# Patient Record
Sex: MALE | URBAN - METROPOLITAN AREA
[De-identification: names, ages, dates, MRNs, and addresses within clinical notes are randomized per-mention and may not be internally consistent; named-entity substitution may affect disease eponyms.]

---

## 2018-08-07 ENCOUNTER — PRE VISIT (OUTPATIENT)
Dept: ORTHOPEDICS | Facility: CLINIC | Age: 58
End: 2018-08-07

## 2018-08-07 NOTE — TELEPHONE ENCOUNTER
FUTURE VISIT INFORMATION      FUTURE VISIT INFORMATION:    Date: 10/4/18    Time: 9:30    [place] [facility] [place]FORMATION:    Referring provider:  Kwasi Johansen    Referring providers clinic:  [facility]    Reason for visit/diagnosis  Brachial Plexus Lesion of R upper extremity/shoulder per referral    RECORDS REQUESTED FROM:       Clinic name Comments Records Status Imaging Status   [facility]  received Mailed 8/10                                   RECORDS STATUS      RECORDS RECEIVED FROM: [facility]   DATE RECEIVED: 8/7   NOTES STATUS DETAILS   OFFICE NOTE from referring provider Received 6/14/18, 5/17/18, 5/1/18   MEDICATION LIST Received    IMPLANT RECORD/STICKER N/A    LABS     CBC/DIFF Received    CULTURES Received    INJECTIONS DONE IN RADIOLOGY Received [facility] 7/9/18   MRI Received 6/25/18, 5/11/18   CT SCAN Received 5/26/18   XRAYS (IMAGES & REPORTS) Received 4/24/18

## 2018-10-03 ASSESSMENT — ENCOUNTER SYMPTOMS
ARTHRALGIAS: 1
JOINT SWELLING: 0
NUMBNESS: 1
STIFFNESS: 1
INSOMNIA: 0
WEIGHT GAIN: 0
DISTURBANCES IN COORDINATION: 0
POLYPHAGIA: 0
DIZZINESS: 0
DECREASED CONCENTRATION: 1
NECK PAIN: 0
LOSS OF CONSCIOUSNESS: 0
MYALGIAS: 1
PARALYSIS: 0
NERVOUS/ANXIOUS: 0
WEIGHT LOSS: 0
DECREASED APPETITE: 0
CHILLS: 0
FATIGUE: 1
HEADACHES: 0
TREMORS: 0
MUSCLE CRAMPS: 1
BACK PAIN: 0
PANIC: 0
INCREASED ENERGY: 1
NIGHT SWEATS: 0
HALLUCINATIONS: 0
MUSCLE WEAKNESS: 1
ALTERED TEMPERATURE REGULATION: 1
POLYDIPSIA: 0
SPEECH CHANGE: 0
SEIZURES: 0
TINGLING: 1
FEVER: 0
DEPRESSION: 0
MEMORY LOSS: 1
WEAKNESS: 1

## 2018-10-04 ENCOUNTER — OFFICE VISIT (OUTPATIENT)
Dept: ORTHOPEDICS | Facility: CLINIC | Age: 58
End: 2018-10-04
Payer: OTHER MISCELLANEOUS

## 2018-10-04 VITALS — WEIGHT: 223.1 LBS | HEIGHT: 72 IN | BODY MASS INDEX: 30.22 KG/M2

## 2018-10-04 DIAGNOSIS — G54.0 BRACHIAL PLEXUS NEUROPATHY: Primary | ICD-10-CM

## 2018-10-04 NOTE — LETTER
10/4/2018       RE: Dev Solomon  [address]  [facility] [address]     Dear Colleague,    Thank you for referring your patient, Dev Solomon, to the [facility] at [facility]. Please see a copy of my visit note below.    [facility]  Orthopedics  Alda Nelson MD  10/04/2018     Name: Dev Solomon  MRN: [id number]  Age: 58 year old  : 1960  Referring provider: Kwasi Johansen     Chief Complaint: No chief complaint on file.     Date of Injury: 18    History of Present Illness:   Dev Solomon is a 58 year old, right handed male workers compensation patient who presents today for evaluation of a  right upper extremity weakness and pain referred by Dr. Johansen at [facility].  Jason was pulling and pushing around a large garbage create when he noted a pop in his right shoulder on 2018.  He was able to move his arm afterwards but noticed immediate onset of pain in the shoulder and significant weakness in the right upper extremity.  This progressed to the point where he was barely able to move his arm within a few days.  He saw an orthopedic surgeon close to home who recommended an MRI scan to evaluate for rotator cuff tear and received a steroid injection which was not helpful.   He eventually saw a neurologist and was diagnosed with parsonage fiore syndrome and has been working with physicial therapy ever since. Today he is here to discuss alternate treatment options and to confirm his diagnosis.    Review of Systems:   A 10-point review of systems was obtained and is negative except for as noted in the HPI.     Medications:   No current outpatient prescriptions on file.    Allergies:  Allergies not on file    Past Medical History:  No past medical history on file.    Past Surgical History:  No past surgical history on file.     Social History:  Patient lives with his wife in [place].  Works at [employer]. No  tobacco.    Social History     Social History     Marital status:      Spouse name: N/A     Number of children: N/A     Years of education: N/A     Social History Main Topics     Smoking status: Not on file     Smokeless tobacco: Not on file     Alcohol use Not on file     Drug use: Not on file     Sexual activity: Not on file     Other Topics Concern     Not on file     Social History Narrative     Family History:  No family history on file.    Physical Examination:  There were no vitals taken for this visit.   strength on left 105 pounds,  strength on right 60 pounds  General: Healthy appearing male. Affect appropriate. Normal gait. Alert and oriented to surroundings.   Right Upper Extremity: Sensation grossly intact to light touch to median ulnar and radial nerve distribution decreased to axillary nerve distribution on right compared to left.  Decreased sensation in medial antebrachial cutaneous nerve distribution on right compared to left.  Plus out of 5 strength to deltoid, 5/5 strength to elbow flexion, elbow extension, wrist flexion, wrist extension, FPL, EPL, interossei.  2+ radial pulse.  Winged scapula on the right.  Minimal atrophy in the supraspinatus and infraspinatus fossa.       Electrodiagnostic Studies:   An electrodiagnostic study of the right extremities was available for review today.  This showed right-sided brachial plexopathy with weakness of the triceps pronator ECRL and winging of the right scapula, consistent with a brachial neuritis.    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  Today we discussed the natural history of the disease along with the prolonged recovery time.  There is little that we can do to help his recovery other than physical therapy for strengthening and haritha of motion.  He has already begun the recovery phase and is likely over the worst of the pain in the weakness. There is no surgery at this time that would help him.   He should continue to work with his orthopedic surgeon closer to home as his strength improves to outline his work restrictions.    Patient was seen and discussed with Dr. Nelson who agrees with this plan    Rina Vogel MD   PGY-4 Orthopaedic Surgery   I have personally examined this patient and have reviewed the clinical presentation and progress note with the resident. I agree with the treatment plan as outlined. The plan was formulated with the resident on the day of the resident's dictation.     Alda Nelson MD   Hand and Upper Extremity Specialist  [facility]      Answers for HPI/ROS submitted by the patient on 10/3/2018   General Symptoms: Yes  Skin Symptoms: No  HENT Symptoms: No  EYE SYMPTOMS: No  HEART SYMPTOMS: No  LUNG SYMPTOMS: No  INTESTINAL SYMPTOMS: No  URINARY SYMPTOMS: No  REPRODUCTIVE SYMPTOMS: No  SKELETAL SYMPTOMS: Yes  BLOOD SYMPTOMS: No  NERVOUS SYSTEM SYMPTOMS: Yes  MENTAL HEALTH SYMPTOMS: Yes  Fever: No  Loss of appetite: No  Weight loss: No  Weight gain: No  Fatigue: Yes  Night sweats: No  Chills: No  Increased stress: Yes  Excessive hunger: No  Excessive thirst: No  Feeling hot or cold when others believe the temperature is normal: Yes  Loss of height: No  Post-operative complications: No  Surgical site pain: No  Hallucinations: No  Change in or Loss of Energy: Yes  Hyperactivity: No  Confusion: Yes  Back pain: No  Muscle aches: Yes  Neck pain: No  Swollen joints: No  Joint pain: Yes  Bone pain: No  Muscle cramps: Yes  Muscle weakness: Yes  Joint stiffness: Yes  Bone fracture: No  Trouble with coordination: No  Dizziness or trouble with balance: No  Fainting or black-out spells: No  Memory loss: Yes  Headache: No  Seizures: No  Speech problems: No  Tingling: Yes  Tremor: No  Weakness: Yes  Difficulty walking: No  Paralysis: No  Numbness: Yes  Nervous or Anxious: No  Depression: No  Trouble sleeping: No  Trouble thinking or concentrating: Yes  Mood changes: Yes  Panic  attacks: No

## 2018-10-04 NOTE — PROGRESS NOTES
[facility]  Orthopedics  Alda Nelson MD  10/04/2018     Name: Dev Solomon  MRN: [id number]  Age: 58 year old  : 1960  Referring provider: Kwasi Johansen     Chief Complaint: No chief complaint on file.     Date of Injury: 18    History of Present Illness:   Dev Solomon is a 58 year old, right handed male workers compensation patient who presents today for evaluation of a  right upper extremity weakness and pain referred by Dr. Johansen at [facility].  Jason was pulling and pushing around a large garbage create when he noted a pop in his right shoulder on 2018.  He was able to move his arm afterwards but noticed immediate onset of pain in the shoulder and significant weakness in the right upper extremity.  This progressed to the point where he was barely able to move his arm within a few days.  He saw an orthopedic surgeon close to home who recommended an MRI scan to evaluate for rotator cuff tear and received a steroid injection which was not helpful.   He eventually saw a neurologist and was diagnosed with parsonage fiore syndrome and has been working with physicial therapy ever since. Today he is here to discuss alternate treatment options and to confirm his diagnosis.    Review of Systems:   A 10-point review of systems was obtained and is negative except for as noted in the HPI.     Medications:   No current outpatient prescriptions on file.    Allergies:  Allergies not on file    Past Medical History:  No past medical history on file.    Past Surgical History:  No past surgical history on file.     Social History:  Patient lives with his wife in [place].  Works at [employer]. No tobacco.    Social History     Social History     Marital status:      Spouse name: N/A     Number of children: N/A     Years of education: N/A     Social History Main Topics     Smoking status: Not on file     Smokeless tobacco: Not on file     Alcohol use Not on file      Drug use: Not on file     Sexual activity: Not on file     Other Topics Concern     Not on file     Social History Narrative     Family History:  No family history on file.    Physical Examination:  There were no vitals taken for this visit.   strength on left 105 pounds,  strength on right 60 pounds  General: Healthy appearing male. Affect appropriate. Normal gait. Alert and oriented to surroundings.   Right Upper Extremity: Sensation grossly intact to light touch to median ulnar and radial nerve distribution decreased to axillary nerve distribution on right compared to left.  Decreased sensation in medial antebrachial cutaneous nerve distribution on right compared to left.  Plus out of 5 strength to deltoid, 5/5 strength to elbow flexion, elbow extension, wrist flexion, wrist extension, FPL, EPL, interossei.  2+ radial pulse.  Winged scapula on the right.  Minimal atrophy in the supraspinatus and infraspinatus fossa.       Electrodiagnostic Studies:   An electrodiagnostic study of the right extremities was available for review today.  This showed right-sided brachial plexopathy with weakness of the triceps pronator ECRL and winging of the right scapula, consistent with a brachial neuritis.    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  Today we discussed the natural history of the disease along with the prolonged recovery time.  There is little that we can do to help his recovery other than physical therapy for strengthening and haritha of motion.  He has already begun the recovery phase and is likely over the worst of the pain in the weakness. There is no surgery at this time that would help him.  He should continue to work with his orthopedic surgeon closer to home as his strength improves to outline his work restrictions.    Patient was seen and discussed with Dr. Nelson who agrees with this plan    Rina Vogel MD   PGY-4 Orthopaedic Surgery   I have personally  examined this patient and have reviewed the clinical presentation and progress note with the resident. I agree with the treatment plan as outlined. The plan was formulated with the resident on the day of the resident's dictation.   Alda Nelson MD   Hand and Upper Extremity Specialist  [facility]        Answers for HPI/ROS submitted by the patient on 10/3/2018   General Symptoms: Yes  Skin Symptoms: No  HENT Symptoms: No  EYE SYMPTOMS: No  HEART SYMPTOMS: No  LUNG SYMPTOMS: No  INTESTINAL SYMPTOMS: No  URINARY SYMPTOMS: No  REPRODUCTIVE SYMPTOMS: No  SKELETAL SYMPTOMS: Yes  BLOOD SYMPTOMS: No  NERVOUS SYSTEM SYMPTOMS: Yes  MENTAL HEALTH SYMPTOMS: Yes  Fever: No  Loss of appetite: No  Weight loss: No  Weight gain: No  Fatigue: Yes  Night sweats: No  Chills: No  Increased stress: Yes  Excessive hunger: No  Excessive thirst: No  Feeling hot or cold when others believe the temperature is normal: Yes  Loss of height: No  Post-operative complications: No  Surgical site pain: No  Hallucinations: No  Change in or Loss of Energy: Yes  Hyperactivity: No  Confusion: Yes  Back pain: No  Muscle aches: Yes  Neck pain: No  Swollen joints: No  Joint pain: Yes  Bone pain: No  Muscle cramps: Yes  Muscle weakness: Yes  Joint stiffness: Yes  Bone fracture: No  Trouble with coordination: No  Dizziness or trouble with balance: No  Fainting or black-out spells: No  Memory loss: Yes  Headache: No  Seizures: No  Speech problems: No  Tingling: Yes  Tremor: No  Weakness: Yes  Difficulty walking: No  Paralysis: No  Numbness: Yes  Nervous or Anxious: No  Depression: No  Trouble sleeping: No  Trouble thinking or concentrating: Yes  Mood changes: Yes  Panic attacks: No

## 2018-10-04 NOTE — NURSING NOTE
Reason For Visit:   Chief Complaint   Patient presents with     Consult     Bracial plexus lesion, right upper extremity.  DOI: 4-. W/C       Primary MD: Pablo So  Referring:   RHONA ELLIOTT    Age: 58 year old    Occupation: [employer].   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   ?  No      Ht 1.829 m (6')  Wt 101.2 kg (223 lb 1.6 oz)  BMI 30.26 kg/m2      Pain Assessment  Patient Currently in Pain: Denies    Hand Dominance Evaluation  Hand Dominance: Right          QuickDASH Assessment  QuickDASH Main 10/3/2018   1.Open a tight or new jar. Moderate difficulty   2. Do heavy household chores (e.g., wash walls, floors) Moderate difficulty   3. Carry a shopping bag or briefcase. Mild difficulty   4. Wash your back. Moderate difficulty   5. Use a knife to cut food. No difficulty   6. Recreational activities in which you take some force or impact through your arm, shoulder or hand (e.g., golf, hammering, tennis, etc.). Severe difficulty   7. During the past week, to what extent has your arm, shoulder or hand problem interfered with your normal social activities with family, friends, neighbours or groups? Slightly   8. During the past week, were you limited in your work or other regular daily activities as a result of your arm, shoulder or hand problem? Very limited   9. Arm, shoulder or hand pain. Mild   10.Tingling (pins and needles) in your arm,shoulder or hand. Moderate   11. During the past week, how much difficulty have you had sleeping because of the pain in your arm, shoulder or hand? ([place] number) No difficulty   Quickdash Ability Score 38.63          No current outpatient prescriptions on file.       Allergies no known allergies    Nicky Breen LPN

## 2018-10-04 NOTE — MR AVS SNAPSHOT
After Visit Summary   10/4/2018    Dev Solomon    MRN: [id number]           Patient Information     Date Of Birth          1960        Visit Information        Provider Department      10/4/2018 9:30 AM [facility]        Today's Diagnoses     Brachial plexus neuropathy    -  1       Follow-ups after your visit        Who to contact     Please call your clinic at [phone] to:    Ask questions about your health    Make or cancel appointments    Discuss your medicines    Learn about your test results    Speak to your doctor            Additional Information About Your Visit        MyChart Information     [employer] gives you secure access to your electronic health record. If you see a primary care provider, you can also send messages to your care team and make appointments. If you have questions, please call your primary care clinic.  If you do not have a primary care provider, please call [phone] and they will assist you.      [employer] is an electronic gateway that provides easy, online access to your medical records. With [employer], you can request a clinic appointment, read your test results, renew a prescription or communicate with your care team.     To access your existing account, please contact your [facility] or call [phone] for assistance.        Care EveryWhere ID     This is your Care EveryWhere ID. This could be used by other organizations to access your [place] medical records  [id number]        Your Vitals Were     Height BMI (Body Mass Index)                1.829 m (6') 30.26 kg/m2           Blood Pressure from Last 3 Encounters:   No data found for BP    Weight from Last 3 Encounters:   10/04/18 101.2 kg (223 lb 1.6 oz)              Today, you had the following     No orders found for display       Primary Care Provider Office Phone # Fax #    Pablo So [phone]       TAMAR  [facility] [address] [facility] [address]        Equal Access to Services     SAMIAJENNIFER PAIGE : Mj Mcdonald, ruiz manzanares, halima toddmaeduardo dean, marcos mastersonariakamron ovalle. So [facility] [phone].    ATENCIÓN: Si habla español, tiene a eddy disposición servicios gratuitos de asistencia lingüística. Llame al [phone].    We comply with applicable federal civil rights laws and [place] laws. We do not discriminate on the basis of race, color, national origin, age, disability, sex, sexual orientation, or gender identity.            Thank you!     Thank you for choosing [facility]  for your care. Our goal is always to provide you with excellent care. Hearing back from our patients is one way we can continue to improve our services. Please take a few minutes to complete the written survey that you may receive in the mail after your visit with us. Thank you!             Your Updated Medication List - Protect others around you: Learn how to safely use, store and throw away your medicines at www.disposemymeds.org.      Notice  As of 10/4/2018 11:59 PM    You have not been prescribed any medications.

## 2019-11-04 ENCOUNTER — HEALTH MAINTENANCE LETTER (OUTPATIENT)
Age: 59
End: 2019-11-04

## 2020-11-16 ENCOUNTER — HEALTH MAINTENANCE LETTER (OUTPATIENT)
Age: 60
End: 2020-11-16

## 2021-09-18 ENCOUNTER — HEALTH MAINTENANCE LETTER (OUTPATIENT)
Age: 61
End: 2021-09-18

## 2022-01-08 ENCOUNTER — HEALTH MAINTENANCE LETTER (OUTPATIENT)
Age: 62
End: 2022-01-08

## 2022-11-20 ENCOUNTER — HEALTH MAINTENANCE LETTER (OUTPATIENT)
Age: 62
End: 2022-11-20

## 2023-04-15 ENCOUNTER — HEALTH MAINTENANCE LETTER (OUTPATIENT)
Age: 63
End: 2023-04-15